# Patient Record
Sex: FEMALE | Race: WHITE | NOT HISPANIC OR LATINO | URBAN - METROPOLITAN AREA
[De-identification: names, ages, dates, MRNs, and addresses within clinical notes are randomized per-mention and may not be internally consistent; named-entity substitution may affect disease eponyms.]

---

## 2018-05-22 NOTE — PATIENT DISCUSSION
Cautioned patient that vitreous detachment will be an ongoing process for a period of time. Just because no tears are seen today, that doesn’t mean that a tear couldn’t develop at any time during the process. New or increased symptoms should be evaluated.

## 2018-05-22 NOTE — PATIENT DISCUSSION
The cataracts are approaching visual significance. We recommended the patient see [facility] for cataract surgery. The patient wishes to defer surgery for 6 months.

## 2018-05-22 NOTE — PATIENT DISCUSSION
Membrane is likely significant. The condition has not progressed much from the last visit with Dr. Yuniel Lozada. The patient wishes to monitor the condition for now. We will follow up in 6 months for a Comp.

## 2018-05-22 NOTE — PATIENT DISCUSSION
Discussed AREDS and recommended AREDS2 vitamins; (AREDS2 information handed out to the patient) recommend daily Amsler grid use; discussed no smoking or second-hand smoke.

## 2018-10-23 NOTE — PATIENT DISCUSSION
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 Observation and reassurance is indicated.

## 2018-10-23 NOTE — PATIENT DISCUSSION
Membrane is significant, and v/a is stable. The patient wishes to monitor the condition for now. We will follow up in 6 months for a Comp.

## 2019-05-01 NOTE — PATIENT DISCUSSION
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

## 2020-06-30 NOTE — PATIENT DISCUSSION
AMD remains persistent but without progression. Continue with Amsler grid and AREDS 2. Avoid direct or indirect smoking. The possibility of progression was discussed. Increased PED, No Fluid.

## 2022-01-19 ENCOUNTER — ESTABLISHED PATIENT (OUTPATIENT)
Dept: URBAN - METROPOLITAN AREA CLINIC 4 | Facility: CLINIC | Age: 77
End: 2022-01-19

## 2022-01-19 DIAGNOSIS — Z96.1: ICD-10-CM

## 2022-01-19 DIAGNOSIS — H40.1132: ICD-10-CM

## 2022-01-19 PROCEDURE — 92133 CPTRZD OPH DX IMG PST SGM ON: CPT

## 2022-01-19 PROCEDURE — 99213 OFFICE O/P EST LOW 20 MIN: CPT

## 2022-01-19 PROCEDURE — 92015 DETERMINE REFRACTIVE STATE: CPT

## 2022-01-19 ASSESSMENT — VISUAL ACUITY
OS_CC: 20/30-1
OD_CC: 20/25

## 2022-01-19 ASSESSMENT — TONOMETRY
OD_IOP_MMHG: 7
OS_IOP_MMHG: 9

## 2022-05-05 ENCOUNTER — ESTABLISHED PATIENT (OUTPATIENT)
Dept: URBAN - METROPOLITAN AREA CLINIC 4 | Facility: CLINIC | Age: 77
End: 2022-05-05

## 2022-05-05 DIAGNOSIS — H40.1132: ICD-10-CM

## 2022-05-05 PROCEDURE — 99213 OFFICE O/P EST LOW 20 MIN: CPT

## 2022-05-05 ASSESSMENT — TONOMETRY
OS_IOP_MMHG: 14
OD_IOP_MMHG: 14

## 2022-05-24 ENCOUNTER — TECH ONLY (OUTPATIENT)
Dept: URBAN - METROPOLITAN AREA CLINIC 4 | Facility: CLINIC | Age: 77
End: 2022-05-24

## 2022-05-24 DIAGNOSIS — H40.1132: ICD-10-CM

## 2022-05-24 PROCEDURE — 92083 EXTENDED VISUAL FIELD XM: CPT

## 2022-08-25 ENCOUNTER — ESTABLISHED PATIENT (OUTPATIENT)
Dept: URBAN - METROPOLITAN AREA CLINIC 4 | Facility: CLINIC | Age: 77
End: 2022-08-25

## 2022-08-25 DIAGNOSIS — H40.1132: ICD-10-CM

## 2022-08-25 PROCEDURE — 99213 OFFICE O/P EST LOW 20 MIN: CPT

## 2022-08-25 ASSESSMENT — VISUAL ACUITY
OS_CC: 20/30-1
OD_CC: 20/30

## 2022-08-25 ASSESSMENT — TONOMETRY
OD_IOP_MMHG: 13
OS_IOP_MMHG: 12

## 2022-09-09 ENCOUNTER — CLINIC PROCEDURE ONLY (OUTPATIENT)
Dept: URBAN - METROPOLITAN AREA CLINIC 4 | Facility: CLINIC | Age: 77
End: 2022-09-09

## 2022-09-09 DIAGNOSIS — H40.1132: ICD-10-CM

## 2022-09-09 PROCEDURE — 65855 TRABECULOPLASTY LASER SURG: CPT

## 2022-09-09 ASSESSMENT — TONOMETRY: OD_IOP_MMHG: 7

## 2022-10-07 ENCOUNTER — CLINIC PROCEDURE ONLY (OUTPATIENT)
Dept: URBAN - METROPOLITAN AREA CLINIC 4 | Facility: CLINIC | Age: 77
End: 2022-10-07

## 2022-10-07 DIAGNOSIS — H40.1132: ICD-10-CM

## 2022-10-07 PROCEDURE — 65855 TRABECULOPLASTY LASER SURG: CPT

## 2022-10-07 ASSESSMENT — TONOMETRY: OS_IOP_MMHG: 14

## 2024-08-15 NOTE — PATIENT DISCUSSION
Minimal progression,  Recommended against surgery at this time given that patient is happy with present vision. Admission